# Patient Record
Sex: FEMALE | Race: ASIAN | Employment: UNEMPLOYED | URBAN - METROPOLITAN AREA
[De-identification: names, ages, dates, MRNs, and addresses within clinical notes are randomized per-mention and may not be internally consistent; named-entity substitution may affect disease eponyms.]

---

## 2018-04-26 ENCOUNTER — HOSPITAL ENCOUNTER (OUTPATIENT)
Age: 48
Discharge: HOME | End: 2018-04-26
Attending: FAMILY MEDICINE
Payer: COMMERCIAL

## 2018-04-26 VITALS
OXYGEN SATURATION: 100 % | DIASTOLIC BLOOD PRESSURE: 71 MMHG | SYSTOLIC BLOOD PRESSURE: 117 MMHG | HEIGHT: 65 IN | HEART RATE: 64 BPM | WEIGHT: 134 LBS | TEMPERATURE: 98 F | BODY MASS INDEX: 22.33 KG/M2 | RESPIRATION RATE: 16 BRPM

## 2018-04-26 DIAGNOSIS — Z48.02 ENCOUNTER FOR REMOVAL OF SUTURES: Primary | ICD-10-CM

## 2018-04-26 PROCEDURE — 99201 PR OUTPT EVAL AND MGNT NEW PT LEVEL 1: CPT

## 2018-04-26 PROCEDURE — 99201 HC OUTPT EVAL AND MGNT NEW PT LEVEL 1: CPT

## 2018-04-26 NOTE — ED PROVIDER NOTES
Patient Seen in: [facility]    History   Patient presents with:  Suture Removal    Stated Complaint: stitches removed x7days    HPI    60-year-old female presents today with her  for suture removal on her upper lip t procedure well. MDM     Follow PCP accordingly.         Disposition and Plan     Clinical Impression:  Encounter for removal of sutures  (primary encounter diagnosis)    Disposition:  Discharge  4/26/2018 10:50 am    Follow-up:  [facility] at
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
